# Patient Record
Sex: MALE | Race: WHITE | URBAN - METROPOLITAN AREA
[De-identification: names, ages, dates, MRNs, and addresses within clinical notes are randomized per-mention and may not be internally consistent; named-entity substitution may affect disease eponyms.]

---

## 2021-12-20 ENCOUNTER — IMMUNIZATION (OUTPATIENT)
Dept: NURSING | Facility: CLINIC | Age: 51
End: 2021-12-20
Payer: COMMERCIAL

## 2021-12-20 PROCEDURE — 91300 PR COVID VAC PFIZER DIL RECON 30 MCG/0.3 ML IM: CPT

## 2021-12-20 PROCEDURE — 0004A PR COVID VAC PFIZER DIL RECON 30 MCG/0.3 ML IM: CPT

## 2022-01-05 ENCOUNTER — LAB (OUTPATIENT)
Dept: URGENT CARE | Facility: URGENT CARE | Age: 52
End: 2022-01-05
Attending: PHYSICIAN ASSISTANT
Payer: COMMERCIAL

## 2022-01-05 ENCOUNTER — E-VISIT (OUTPATIENT)
Dept: URGENT CARE | Facility: URGENT CARE | Age: 52
End: 2022-01-05
Payer: COMMERCIAL

## 2022-01-05 DIAGNOSIS — Z20.822 CLOSE EXPOSURE TO 2019 NOVEL CORONAVIRUS: Primary | ICD-10-CM

## 2022-01-05 DIAGNOSIS — Z20.822 CLOSE EXPOSURE TO 2019 NOVEL CORONAVIRUS: ICD-10-CM

## 2022-01-05 LAB — SARS-COV-2 RNA RESP QL NAA+PROBE: NEGATIVE

## 2022-01-05 PROCEDURE — U0003 INFECTIOUS AGENT DETECTION BY NUCLEIC ACID (DNA OR RNA); SEVERE ACUTE RESPIRATORY SYNDROME CORONAVIRUS 2 (SARS-COV-2) (CORONAVIRUS DISEASE [COVID-19]), AMPLIFIED PROBE TECHNIQUE, MAKING USE OF HIGH THROUGHPUT TECHNOLOGIES AS DESCRIBED BY CMS-2020-01-R: HCPCS

## 2022-01-05 PROCEDURE — U0005 INFEC AGEN DETEC AMPLI PROBE: HCPCS

## 2022-01-05 PROCEDURE — 99421 OL DIG E/M SVC 5-10 MIN: CPT | Performed by: PHYSICIAN ASSISTANT

## 2022-01-05 NOTE — PATIENT INSTRUCTIONS
Arnel,    Based on your exposure to COVID-19 (coronavirus), we would like to test you for this virus. I have placed an order for this test. The best time for testing is 5-7 days after the exposure.    How to schedule:  For all employees or close contacts (except [place] and [employer] - see below), go to your [employer] home page and scroll down to the section that says  You have an appointment that needs to be scheduled  and click the large green button that says  Schedule Now  and follow the steps to find the next available opening.     If you are unable to complete these steps or if you cannot find any available times, please call [phone] to schedule employee testing.     [place] employees or close contacts, please call [phone].   [place] ([employer]) employees or close contacts call [phone].    Monoclonal antibody treatment after exposure:  Because you have been exposed to COVID-19, you may be able to receive a treatment with monoclonal antibodies. This treatment can lower your risk of severe illness and going to the hospital. It is given through an IV or under your skin (subcutaneous) and must be given at an infusion center.   To be eligible, you must be 12 years or older, at least 88 pounds and:    Are not fully vaccinated against COVID-19, OR    Are immunocompromised     If you would like to sign up to be considered to receive the monoclonal antibody medicine, please complete a participation form through the [facility] here:  MNRAP ([email or web address]/[email or web address]). You may also call the [facility] COVID-19 Public Hotline at 1-877.415.1967 (open Mon-Fri: 9am-7pm and Sat: 10am-6pm).     Not all people who are eligible will receive the medicine since supply is limited. You will be contacted in the next 1 to 2 business days only if you are selected. If you do not receive a call, you have not been selected to receive the medicine. If you have any questions  about this medication, please contact your primary care provider. For more information, see [email or web address]/[email or web address]/[email or web address]/[email or web address]    Return to work/school/ guidance:   Please let your workplace manager and staffing office know when your quarantine ends. We cannot give you an exact date as it depends on the information below. You can calculate this on your own or work with your manager/staffing office to calculate this.    Quarantine Guidelines:  You are considered exposed if you have been within 6 feet of an infected person(s) for 15 minutes or more over a 24-hour period. Quarantine will start after the LAST time you had contact with the infected person while they were contagious (for example, if you saw someone on Monday and Wednesday, your quarantine would start after Wednesday).     If you have NO symptoms (asymptomatic):    Stay home for 14 days (quarantine) after your LAST contact with a person who has COVID-19 (this remains the CDC recommendation for greatest protection against spread of COVID-19), OR    10-day quarantine with no test, OR    Minimum 7-day quarantine with negative RT-PCR test collected on day 5 or later    Quarantine Guideline Exceptions:    People who have been fully vaccinated do not need to quarantine unless they have symptoms. You are considered fully vaccinated 2 weeks after your 2nd dose in a 2-dose series or 2 weeks after a single-dose series. This includes vaccinated health care workers.  o Fully vaccinated people should still get tested 5-7 days after exposure, even if they don t have symptoms.   Note: If you have ongoing exposure to the COVID-positive person, this quarantine period may be more than 14 days. For example, if you continue to be exposed to your child who tested positive and cannot isolate from them, then the quarantine of 7-14 days can't start until your child is no longer contagious. This is typically 10 days from onset of the child's  symptoms. So, the total duration may be 17-24 days in this case.   Please contact your doctor if you have questions or call the [facility] Public Hotline: 1-193.406.2915 (Mon-Fri: 9am-7pm and Sat: 10am-6pm).     How to Quarantine:     Monitor your symptoms until 14 days after your last exposure. If you develop signs or symptoms, isolate and get tested (even if you have been tested already).    Stay home and away from others. Don't go to school or anywhere else. Generally, quarantine means staying home from work but there are some exceptions to this. Please contact your workplace. Cover your mouth and nose with a face covering if you must be around other people.     Wash your hands and face often. Use soap and water.    What are the symptoms of COVID-19?  The most common symptoms are cough, fever and trouble breathing. Less common symptoms include headache, body aches, fatigue (feeling very tired), chills, sore throat, stuffy or runny nose, diarrhea (loose poop), loss of taste or smell, belly pain, and nausea or vomiting (feeling sick to your stomach or throwing up).  If you develop symptoms, follow these guidelines:    If you're normally healthy: Please start another eVisit.    If you have a serious health problem (like cancer, heart failure, an organ transplant or kidney disease): Call your specialty clinic. Let them know that you might have COVID-19.    Where can I get more information?    [facility] - About COVID-19: [email or web address]     CDC - What to Do If You're Sick:      www.cdc.gov/coronavirus/2019-ncov/about/steps-when-sick.html    CDC - Ending Home Isolation:  https://www.cdc.gov/coronavirus/2019-ncov/your-health/quarantine-isolation.html    CDC - Caring for Someone:  www.cdc.gov/coronavirus/2019-ncov/if-you-are-sick/care-for-someone.html    [facility] clinical trials (COVID-19 research studies): clinicalaffairs.[facility]/n-clinical-trials    Below are the COVID-19 hotlines at  "the [place] Department of Health ([facility]). Interpreters are available.  o For health questions: Call 710-316-4370 or 1-335.554.1498 (7 a.m. to 7 p.m.)  o For questions about schools and childcare: Call 378-195-5103 or 1-511.526.2809 (7 a.m. to 7 p.m.)    January 5, 2022  RE:  Abhinav Barrett                                                                                                                   [address] [facility] [address]      To whom it may concern:    I evaluated Abhinav Barrett on January 5, 2022. Abhinav Barrett should be excused from work/school.    They should let their workplace manager and staffing office know when their quarantine ends.    We can not give an exact date as it depends on the information below. They can calculate this on their own or work with their manager/staffing office to calculate this. (For example if they were exposed on 10/04, they would have to quarantine for 14 full days. That would be through 10/18. They could return on 10/19.)    Quarantine Guidelines:    Patients (\"contacts\") who have been in close prolonged contact of an infected person(s) (within six feet for at least 15 minutes within a 24 hour period) and remain asymptomatic should enter quarantine based on the following options:      14-day quarantine period (this remains the CDC recommendation for the greatest protection against spread of COVID-19) OR    Minimum 7-day quarantine with negative RT-PCR test collected on day 5 or later OR    10-day quarantine with no test   Quarantine Guideline exceptions are as follows:    People who have been fully vaccinated do not need to quarantine if the exposure was at least 2 weeks after the last vaccination. This includes vaccinated health care workers.    Not fully vaccinated and unvaccinated Individuals who work in health care, congregate care, or congregate living should be off work for 14 days from their last date of exposure. Community activities for " this group can be resumed based on options above. Fully vaccinated individuals in this group do not need to quarantine from work after exposure.    Not fully vaccinated and unvaccinated people whose high-risk exposure was a household member should always quarantine for 14 days from their last date of exposure. Fully vaccinated people in this category do not need to quarantine.    Not fully vaccinated or unvaccinated residents of congregate care and congregate living settings should always quarantine for 14 days from their last date of exposure. Fully vaccinated residents do not need to quarantine.    Note: If there is ongoing exposure to the covid positive person, this quarantine period may be longer than 14 days. (For example, if they are continually exposed to their child, who tested positive and cannot isolate from them, then the quarantine of 7-14 days can't start until their child is no longer contagious. This is typically 10 days from onset to the child's symptoms. So the total duration may be 17-24 days in this case.)    Abhinav Barrett should continue symptom monitoring until day 14 post-exposure. If they develop signs or symptoms of COVID-19, they should isolate and get tested (even if they have been tested already).    Sincerely,  Deandre Pendleton PA-C

## 2022-06-02 ENCOUNTER — E-VISIT (OUTPATIENT)
Dept: URGENT CARE | Facility: CLINIC | Age: 52
End: 2022-06-02
Payer: COMMERCIAL

## 2022-06-02 ENCOUNTER — OFFICE VISIT (OUTPATIENT)
Dept: URGENT CARE | Facility: URGENT CARE | Age: 52
End: 2022-06-02
Payer: COMMERCIAL

## 2022-06-02 VITALS
TEMPERATURE: 97.4 F | OXYGEN SATURATION: 96 % | HEART RATE: 60 BPM | DIASTOLIC BLOOD PRESSURE: 90 MMHG | WEIGHT: 145.6 LBS | SYSTOLIC BLOOD PRESSURE: 137 MMHG | BODY MASS INDEX: 28.44 KG/M2

## 2022-06-02 DIAGNOSIS — H57.9 ITCHY EYES: Primary | ICD-10-CM

## 2022-06-02 DIAGNOSIS — H10.13 ALLERGIC CONJUNCTIVITIS OF BOTH EYES: Primary | ICD-10-CM

## 2022-06-02 PROCEDURE — 99207 PR NON-BILLABLE SERV PER CHARTING: CPT | Performed by: FAMILY MEDICINE

## 2022-06-02 PROCEDURE — 99203 OFFICE O/P NEW LOW 30 MIN: CPT | Performed by: PHYSICIAN ASSISTANT

## 2022-06-02 RX ORDER — OLOPATADINE HYDROCHLORIDE 1 MG/ML
1 SOLUTION/ DROPS OPHTHALMIC 2 TIMES DAILY
Qty: 51 ML | Refills: 0 | Status: SHIPPED | OUTPATIENT
Start: 2022-06-02 | End: 2022-06-07

## 2022-06-02 RX ORDER — CETIRIZINE HYDROCHLORIDE 10 MG/1
10 TABLET ORAL DAILY
Qty: 30 TABLET | Refills: 0 | Status: SHIPPED | OUTPATIENT
Start: 2022-06-02

## 2022-06-02 NOTE — PATIENT INSTRUCTIONS
Dear Abhinav Barrett,    We are sorry you are not feeling well. Based on the responses you provided, it is recommended that you be seen in-person in urgent care so we can better evaluate your symptoms. Please click here to find the nearest urgent care location to you.   You will not be charged for this Visit. Thank you for trusting us with your care.    Leatha Stanley MD

## 2022-06-02 NOTE — PROGRESS NOTES
Assessment & Plan     Allergic conjunctivitis of both eyes    Conjunctivitis is an irritation of the thin membrane covering the eye and the inside of the eyelid. This membrane is called the conjunctiva. The condition is often called pink eye or red eye because the eye looks pink or red. The eye may also be swollen, itchy, burning, or tearing. A watery or mucous discharge may occur. This type of conjunctivitis is not contagious.   Allergic conjunctivitis is caused by an allergen. Allergens are substances that cause the body to react with certain symptoms. Allergens that cause eye irritation include things such as house dust, smoke, or pollen in the air. This can occur seasonally, most often in the spring. Other possible allergens that may cause symptoms include cosmetics, perfumes, animal saliva or dander, chlorine in swimming pools, or contact lens.     - olopatadine (PATANOL) 0.1 % ophthalmic solution; Place 1 drop into both eyes 2 times daily for 5 days  - cetirizine (ZYRTEC) 10 MG tablet; Take 1 tablet (10 mg) by mouth daily        No follow-ups on file.    Victor Manuel Molina, [facility], PA-C  [facility] ROLANDO Seals is a 52 year old who presents for the following health issues     HPI     Abhinav Barrett, 52 year old, male presents to the urgent care today with:   Urgent Care (Itchy , crusted, watery eyes, irritation, and eye lids hurt which started five days ago.)      Review of Systems   Constitutional, HEENT, cardiovascular, pulmonary, gi and gu systems are negative, except as otherwise noted.      Objective    BP (!) 137/90 (BP Location: Left arm, Patient Position: Sitting, Cuff Size: Adult Regular)   Pulse 60   Temp 97.4  F (36.3  C) (Tympanic)   Wt 66 kg (145 lb 9.6 oz)   SpO2 96%   BMI 28.44 kg/m    Body mass index is 28.44 kg/m .  Physical Exam   GENERAL: healthy, alert and no distress  EYES: Eyes grossly normal to inspection, PERRL and conjunctivae and sclerae  normal  HENT: ear canals and TM's normal, nose and mouth without ulcers or lesions  NECK: no adenopathy, no asymmetry, masses, or scars and thyroid normal to palpation  MS: no gross musculoskeletal defects noted, no edema  SKIN: Positive for itching along edges of eyelids  NEURO: Normal strength and tone, mentation intact and speech normal  PSYCH: mentation appears normal, affect normal/bright

## 2022-07-22 ENCOUNTER — OFFICE VISIT (OUTPATIENT)
Dept: URGENT CARE | Facility: URGENT CARE | Age: 52
End: 2022-07-22
Payer: COMMERCIAL

## 2022-07-22 VITALS
SYSTOLIC BLOOD PRESSURE: 127 MMHG | HEART RATE: 80 BPM | DIASTOLIC BLOOD PRESSURE: 88 MMHG | OXYGEN SATURATION: 99 % | TEMPERATURE: 98.7 F

## 2022-07-22 DIAGNOSIS — J30.89 SEASONAL ALLERGIC RHINITIS DUE TO OTHER ALLERGIC TRIGGER: ICD-10-CM

## 2022-07-22 DIAGNOSIS — J06.9 UPPER RESPIRATORY TRACT INFECTION, UNSPECIFIED TYPE: Primary | ICD-10-CM

## 2022-07-22 LAB — SARS-COV-2 RNA RESP QL NAA+PROBE: NEGATIVE

## 2022-07-22 PROCEDURE — 99214 OFFICE O/P EST MOD 30 MIN: CPT | Performed by: PHYSICIAN ASSISTANT

## 2022-07-22 PROCEDURE — U0005 INFEC AGEN DETEC AMPLI PROBE: HCPCS | Performed by: PHYSICIAN ASSISTANT

## 2022-07-22 PROCEDURE — U0003 INFECTIOUS AGENT DETECTION BY NUCLEIC ACID (DNA OR RNA); SEVERE ACUTE RESPIRATORY SYNDROME CORONAVIRUS 2 (SARS-COV-2) (CORONAVIRUS DISEASE [COVID-19]), AMPLIFIED PROBE TECHNIQUE, MAKING USE OF HIGH THROUGHPUT TECHNOLOGIES AS DESCRIBED BY CMS-2020-01-R: HCPCS | Performed by: PHYSICIAN ASSISTANT

## 2022-07-22 RX ORDER — OLOPATADINE HYDROCHLORIDE 1 MG/ML
1 SOLUTION/ DROPS OPHTHALMIC 2 TIMES DAILY
Qty: 5 ML | Refills: 0 | Status: SHIPPED | OUTPATIENT
Start: 2022-07-22

## 2022-07-22 RX ORDER — CETIRIZINE HYDROCHLORIDE 10 MG/1
10 TABLET ORAL DAILY
Qty: 30 TABLET | Refills: 0 | Status: SHIPPED | OUTPATIENT
Start: 2022-07-22

## 2022-07-22 RX ORDER — FLUTICASONE PROPIONATE 50 MCG
1 SPRAY, SUSPENSION (ML) NASAL DAILY
Qty: 16 G | Refills: 0 | Status: SHIPPED | OUTPATIENT
Start: 2022-07-22

## 2022-07-22 NOTE — PROGRESS NOTES
Assessment & Plan     Upper respiratory tract infection, unspecified type    Patient was educated on the natural course of viral illness which typically lasts up to 7-10 days.  Conservative measures discussed including rest, increased fluids, nasal saline irrigation (neti pot), warm steamy shower, salt water gargles, cough suppressants, expectorants (Mucinex), afrin nasal spray for up to 3 days as needed for congestion, and analgesics (Tylenol and/or Ibuprofen). If symptoms persist or worsen then follow up with primary care provider or return to the urgent care.  Seek emergency care if you develop fever over 104 shortness of breath, neck stiffness with headache or severe symptoms that require immediate attention.  Patient understands and agrees with plan.     - Symptomatic; Unknown COVID-19 Virus (Coronavirus) by PCR Nose    Seasonal allergic rhinitis due to other allergic trigger    - fluticasone (FLONASE) 50 MCG/ACT nasal spray; Spray 1 spray into both nostrils daily  - cetirizine (ZYRTEC) 10 MG tablet; Take 1 tablet (10 mg) by mouth daily  - olopatadine (PATANOL) 0.1 % ophthalmic solution; Place 1 drop into both eyes 2 times daily    Review of external notes as documented elsewhere in note    At today's visit with Abhinav Barrett , we discussed results, diagnosis, medications and formulated a plan.  We also discussed red flags for immediate return to clinic/ER, as well as indications for follow up with PCP if not improved in 3 days. Patient understood and agreed to plan. Abhinav Barrett was discharged with stable vitals and has no further questions.       No follow-ups on file.    Victor Manuel Molina, [facility], PA-OSMAR  [facility] ROLANDO Seals is a 52 year old, presenting for the following health issues:  Nasal Congestion (RUNNY NOSE DRAINING DOWN THROAT AND NOSE, WATERY CRUSTY EYES, SNEEZING, FOR THE PAST 3 DAYS, ITCHY MOUTH) and Headache (FOR THE PAST 3 DAYS)      HPI     Abhinav MOORE  Arnold, 52 year old, male presents to the urgent care today with:   Nasal Congestion (RUNNY NOSE DRAINING DOWN THROAT AND NOSE, WATERY CRUSTY EYES, SNEEZING, FOR THE PAST 3 DAYS, ITCHY MOUTH) and Headache (FOR THE PAST 3 DAYS)      Review of Systems   Constitutional, HEENT, cardiovascular, pulmonary, gi and gu systems are negative, except as otherwise noted.      Objective    /88 (BP Location: Left arm, Patient Position: Sitting, Cuff Size: Adult Regular)   Pulse 80   Temp 98.7  F (37.1  C)   SpO2 99%   There is no height or weight on file to calculate BMI.  Physical Exam   GENERAL: healthy, alert and no distress  EYES: Eyes grossly normal to inspection, PERRL and conjunctivae and sclerae normal  HENT: Positive for nasal congestion, runny nose  NECK: no adenopathy, no asymmetry, masses, or scars and thyroid normal to palpation  RESP: lungs clear to auscultation - no rales, rhonchi or wheezes  CV: regular rate and rhythm, normal S1 S2, no S3 or S4, no murmur, click or rub, no peripheral edema and peripheral pulses strong  MS: no gross musculoskeletal defects noted, no edema  SKIN: no suspicious lesions or rashes  NEURO: Normal strength and tone, mentation intact and speech normal  PSYCH: mentation appears normal, affect normal/bright                    .  ..

## 2022-09-12 ENCOUNTER — OFFICE VISIT (OUTPATIENT)
Dept: URGENT CARE | Facility: URGENT CARE | Age: 52
End: 2022-09-12
Payer: COMMERCIAL

## 2022-09-12 VITALS
OXYGEN SATURATION: 98 % | SYSTOLIC BLOOD PRESSURE: 128 MMHG | RESPIRATION RATE: 18 BRPM | DIASTOLIC BLOOD PRESSURE: 84 MMHG | WEIGHT: 145 LBS | HEART RATE: 75 BPM | TEMPERATURE: 96.9 F | BODY MASS INDEX: 28.32 KG/M2

## 2022-09-12 DIAGNOSIS — R07.0 THROAT PAIN: Primary | ICD-10-CM

## 2022-09-12 DIAGNOSIS — H60.393 INFECTIVE OTITIS EXTERNA, BILATERAL: ICD-10-CM

## 2022-09-12 LAB
DEPRECATED S PYO AG THROAT QL EIA: NEGATIVE
GROUP A STREP BY PCR: NOT DETECTED
SARS-COV-2 RNA RESP QL NAA+PROBE: NEGATIVE

## 2022-09-12 PROCEDURE — U0005 INFEC AGEN DETEC AMPLI PROBE: HCPCS | Performed by: FAMILY MEDICINE

## 2022-09-12 PROCEDURE — 87651 STREP A DNA AMP PROBE: CPT | Performed by: FAMILY MEDICINE

## 2022-09-12 PROCEDURE — U0003 INFECTIOUS AGENT DETECTION BY NUCLEIC ACID (DNA OR RNA); SEVERE ACUTE RESPIRATORY SYNDROME CORONAVIRUS 2 (SARS-COV-2) (CORONAVIRUS DISEASE [COVID-19]), AMPLIFIED PROBE TECHNIQUE, MAKING USE OF HIGH THROUGHPUT TECHNOLOGIES AS DESCRIBED BY CMS-2020-01-R: HCPCS | Performed by: FAMILY MEDICINE

## 2022-09-12 PROCEDURE — 99213 OFFICE O/P EST LOW 20 MIN: CPT | Mod: CS | Performed by: FAMILY MEDICINE

## 2022-09-12 RX ORDER — NEOMYCIN SULFATE, POLYMYXIN B SULFATE AND HYDROCORTISONE 10; 3.5; 1 MG/ML; MG/ML; [USP'U]/ML
3 SUSPENSION/ DROPS AURICULAR (OTIC) 4 TIMES DAILY
Qty: 6 ML | Refills: 0 | Status: SHIPPED | OUTPATIENT
Start: 2022-09-12 | End: 2022-09-22

## 2022-09-12 NOTE — PROGRESS NOTES
SUBJECTIVE:Abhinav Barrett is a 52 year old male with a chief complaint of sore throat.    Onset of symptoms was 4 day(s) ago.    Course of illness: still present.    Severity moderate  Predisposing factors include ill contact: Family member .    Past Medical History:   Diagnosis Date     NO ACTIVE PROBLEMS      Allergies   Allergen Reactions     No Known Drug Allergies      Social History     Tobacco Use     Smoking status: Never Smoker     Smokeless tobacco: Not on file   Substance Use Topics     Alcohol use: Yes       ROS:  SKIN: no rash  GI: no vomiting    OBJECTIVE:   /84   Pulse 75   Temp 96.9  F (36.1  C) (Tympanic)   Resp 18   Wt 65.8 kg (145 lb)   SpO2 98%   BMI 28.32 kg/m  GENERAL APPEARANCE: healthy, alert and no distress  EYES: EOMI,  PERRL, conjunctiva clear  HENT: ear canals and TM's normal.  Nose normal.  Pharynx erythematous with some exudate noted.  RESP: lungs clear to auscultation - no rales, rhonchi or wheezes  SKIN: no suspicious lesions or rashes    Rapid Strep test is negative; await throat culture results.      ICD-10-CM    1. Throat pain  R07.0 Streptococcus A Rapid Screen w/Reflex to PCR     Symptomatic; Yes; 9/8/2022 COVID-19 Virus (Coronavirus) by PCR Nose     Group A Streptococcus PCR Throat Swab   2. Infective otitis externa, bilateral  H60.393 neomycin-polymyxin-hydrocortisone (CORTISPORIN) [id number] otic suspension     Symptomatic treat with gargles, lozenges, and OTC analgesic as needed.  Follow-up with primary clinic if not improving.

## 2023-09-25 ENCOUNTER — OFFICE VISIT (OUTPATIENT)
Dept: URGENT CARE | Facility: URGENT CARE | Age: 53
End: 2023-09-25
Payer: COMMERCIAL

## 2023-09-25 VITALS
TEMPERATURE: 98.9 F | OXYGEN SATURATION: 97 % | DIASTOLIC BLOOD PRESSURE: 70 MMHG | HEART RATE: 134 BPM | RESPIRATION RATE: 18 BRPM | SYSTOLIC BLOOD PRESSURE: 122 MMHG

## 2023-09-25 DIAGNOSIS — J06.9 UPPER RESPIRATORY TRACT INFECTION, UNSPECIFIED TYPE: Primary | ICD-10-CM

## 2023-09-25 PROCEDURE — 87635 SARS-COV-2 COVID-19 AMP PRB: CPT | Performed by: FAMILY MEDICINE

## 2023-09-25 PROCEDURE — 99214 OFFICE O/P EST MOD 30 MIN: CPT | Performed by: FAMILY MEDICINE

## 2023-09-25 RX ORDER — BENZONATATE 200 MG/1
200 CAPSULE ORAL 3 TIMES DAILY PRN
Qty: 21 CAPSULE | Refills: 0 | Status: SHIPPED | OUTPATIENT
Start: 2023-09-25 | End: 2023-10-02

## 2023-09-25 RX ORDER — CETIRIZINE HYDROCHLORIDE 10 MG/1
10 TABLET ORAL DAILY
Qty: 30 TABLET | Refills: 0 | Status: SHIPPED | OUTPATIENT
Start: 2023-09-25 | End: 2023-10-25

## 2023-09-25 RX ORDER — FLUTICASONE PROPIONATE 50 MCG
2 SPRAY, SUSPENSION (ML) NASAL DAILY
Qty: 15.8 ML | Refills: 0 | Status: SHIPPED | OUTPATIENT
Start: 2023-09-25 | End: 2023-10-23

## 2023-09-25 NOTE — PROGRESS NOTES
"SUBJECTIVE: Abhinav Barrett is a 53 year old male presenting with a chief complaint of \"cold symptoms\".  Onset of symptoms was 4 day(s) ago.  Predisposing factors include None.    Past Medical History:   Diagnosis Date    NO ACTIVE PROBLEMS      Allergies   Allergen Reactions    No Known Drug Allergy      Social History     Tobacco Use    Smoking status: Never    Smokeless tobacco: Never   Substance Use Topics    Alcohol use: Yes       ROS:  SKIN: no rash  GI: no vomiting    OBJECTIVE:  /70   Pulse (!) 134   Temp 98.9  F (37.2  C) (Tympanic)   Resp 18   SpO2 97% GENERAL APPEARANCE: healthy, alert and no distress  EYES: EOMI,  PERRL, conjunctiva clear  HENT: ear canals and TM's normal.  Nose and mouth without ulcers, erythema or lesions  RESP: lungs clear to auscultation - no rales, rhonchi or wheezes  SKIN: no suspicious lesions or rashes      ICD-10-CM    1. Upper respiratory tract infection, unspecified type  J06.9 benzonatate (TESSALON) 200 MG capsule     cetirizine (ZYRTEC) 10 MG tablet     fluticasone (FLONASE) 50 MCG/ACT nasal spray     Symptomatic COVID-19 Virus (Coronavirus) by PCR          Fluids/Rest, f/u if worse/not any better    "

## 2023-09-26 LAB — SARS-COV-2 RNA RESP QL NAA+PROBE: NEGATIVE

## 2023-10-23 DIAGNOSIS — J06.9 UPPER RESPIRATORY TRACT INFECTION, UNSPECIFIED TYPE: ICD-10-CM

## 2023-10-23 RX ORDER — FLUTICASONE PROPIONATE 50 MCG
2 SPRAY, SUSPENSION (ML) NASAL DAILY
Qty: 16 G | Refills: 0 | Status: SHIPPED | OUTPATIENT
Start: 2023-10-23

## 2023-11-25 ENCOUNTER — HEALTH MAINTENANCE LETTER (OUTPATIENT)
Age: 53
End: 2023-11-25

## 2024-05-03 ENCOUNTER — OFFICE VISIT (OUTPATIENT)
Dept: URGENT CARE | Facility: URGENT CARE | Age: 54
End: 2024-05-03
Payer: COMMERCIAL

## 2024-05-03 VITALS
WEIGHT: 145 LBS | DIASTOLIC BLOOD PRESSURE: 100 MMHG | SYSTOLIC BLOOD PRESSURE: 152 MMHG | OXYGEN SATURATION: 96 % | TEMPERATURE: 99.3 F | BODY MASS INDEX: 28.32 KG/M2 | HEART RATE: 90 BPM

## 2024-05-03 DIAGNOSIS — R50.9 FEVER AND CHILLS: ICD-10-CM

## 2024-05-03 DIAGNOSIS — J02.0 STREP THROAT: ICD-10-CM

## 2024-05-03 DIAGNOSIS — R07.0 THROAT PAIN: Primary | ICD-10-CM

## 2024-05-03 LAB
DEPRECATED S PYO AG THROAT QL EIA: POSITIVE
FLUAV AG SPEC QL IA: NEGATIVE
FLUBV AG SPEC QL IA: NEGATIVE

## 2024-05-03 PROCEDURE — 99213 OFFICE O/P EST LOW 20 MIN: CPT | Performed by: FAMILY MEDICINE

## 2024-05-03 PROCEDURE — 87804 INFLUENZA ASSAY W/OPTIC: CPT | Performed by: FAMILY MEDICINE

## 2024-05-03 PROCEDURE — 87880 STREP A ASSAY W/OPTIC: CPT | Performed by: FAMILY MEDICINE

## 2024-05-03 RX ORDER — AMOXICILLIN 500 MG/1
500 CAPSULE ORAL 2 TIMES DAILY
Qty: 20 CAPSULE | Refills: 0 | Status: SHIPPED | OUTPATIENT
Start: 2024-05-03 | End: 2024-05-13

## 2024-05-04 NOTE — PROGRESS NOTES
SUBJECTIVE:Abhinav Barrett is a 53 year old male with a chief complaint of sore throat.    Onset of symptoms was 2 day(s) ago.    Course of illness: still present.      Past Medical History:   Diagnosis Date    NO ACTIVE PROBLEMS      Allergies   Allergen Reactions    No Known Drug Allergy      Social History     Tobacco Use    Smoking status: Never    Smokeless tobacco: Never   Substance Use Topics    Alcohol use: Yes       ROS:  SKIN: no rash  GI: no vomiting    OBJECTIVE:   BP (!) 152/100   Pulse 90   Temp 99.3  F (37.4  C) (Tympanic)   Wt 65.8 kg (145 lb)   SpO2 96%   BMI 28.32 kg/m  GENERAL APPEARANCE: healthy, alert and no distress  EYES: EOMI,  PERRL, conjunctiva clear  HENT: ear canals and TM's normal.  Nose normal.  Pharynx erythematous with some exudate noted.  SKIN: no suspicious lesions or rashes    Rapid Strep test is positive      ICD-10-CM    1. Throat pain  R07.0 Streptococcus A Rapid Screen w/Reflex to PCR - Clinic Collect     Influenza A & B Antigen - Clinic Collect      2. Fever and chills  R50.9 Influenza A & B Antigen - Clinic Collect      3. Strep throat  J02.0 amoxicillin (AMOXIL) 500 MG capsule          Symptomatic treat with gargles, lozenges, and OTC analgesic as needed.  Follow-up with primary clinic if not improving.

## 2024-07-17 ENCOUNTER — OFFICE VISIT (OUTPATIENT)
Dept: URGENT CARE | Facility: URGENT CARE | Age: 54
End: 2024-07-17
Payer: COMMERCIAL

## 2024-07-17 VITALS
DIASTOLIC BLOOD PRESSURE: 84 MMHG | HEART RATE: 114 BPM | OXYGEN SATURATION: 97 % | BODY MASS INDEX: 27.15 KG/M2 | TEMPERATURE: 100.2 F | RESPIRATION RATE: 20 BRPM | WEIGHT: 139 LBS | SYSTOLIC BLOOD PRESSURE: 134 MMHG

## 2024-07-17 DIAGNOSIS — R09.81 STUFFY NOSE: ICD-10-CM

## 2024-07-17 DIAGNOSIS — M79.10 MYALGIA: ICD-10-CM

## 2024-07-17 DIAGNOSIS — R50.9 FEVER AND CHILLS: Primary | ICD-10-CM

## 2024-07-17 LAB
FLUAV AG SPEC QL IA: NEGATIVE
FLUBV AG SPEC QL IA: NEGATIVE

## 2024-07-17 PROCEDURE — 99213 OFFICE O/P EST LOW 20 MIN: CPT | Performed by: STUDENT IN AN ORGANIZED HEALTH CARE EDUCATION/TRAINING PROGRAM

## 2024-07-17 PROCEDURE — 87804 INFLUENZA ASSAY W/OPTIC: CPT | Performed by: STUDENT IN AN ORGANIZED HEALTH CARE EDUCATION/TRAINING PROGRAM

## 2024-07-17 PROCEDURE — 87635 SARS-COV-2 COVID-19 AMP PRB: CPT | Performed by: STUDENT IN AN ORGANIZED HEALTH CARE EDUCATION/TRAINING PROGRAM

## 2024-07-17 NOTE — LETTER
July 17, 2024      Abhinav Barrett  [address]  [facility] [address]        To Whom It May Concern:    Abhinav Barrett  was seen on 7/17/20247.  Please excuse him  until 7/20/2024 due to illness. Please excuse for the days missed due to illnesss.         Sincerely,        Ruperto Hensley MD

## 2024-07-18 ENCOUNTER — TELEPHONE (OUTPATIENT)
Dept: NURSING | Facility: CLINIC | Age: 54
End: 2024-07-18
Payer: COMMERCIAL

## 2024-07-18 LAB — SARS-COV-2 RNA RESP QL NAA+PROBE: POSITIVE

## 2024-07-18 NOTE — PATIENT INSTRUCTIONS
-Conservative measures such as warm water or tea with honey  -Using a steamer, okay to use vicks menthol in the steamer  -As needed ibuprofen or Tylenol for fever  -Push fluids and rest  -Patient aware that her results will be delivered through MyChart  -Seek urgent medical care if symptoms worsen, you notice increased fever despite taking meds, van not eat or you feel very weak

## 2024-07-18 NOTE — TELEPHONE ENCOUNTER
Patient classified as COVID treatment eligible by Epic high risk algorithm:  No    Coronavirus (COVID-19) Notification    Reason for call  Notify of POSITIVE COVID-19 lab result, assess symptoms,  review [facility] recommendations    Lab Result   Lab test for 2019-nCoV rRt-PCR or SARS-COV-2 PCR  Oropharyngeal AND/OR nasopharyngeal swabs were POSITIVE for 2019-nCoV RNA [OR] SARS-COV-2 RNA (COVID-19) RNA     We have been unable to reach patient by phone at this time to notify of their Positive COVID-19 result.    Left voicemail message requesting a call back to [phone] [facility] for results.        A Positive COVID-19 letter will be sent via [employer] or the mail.    Leatha Last

## 2024-07-18 NOTE — PROGRESS NOTES
chief complaint: URI symptoms    HPI:  Abhinav Barrett is a 54 year old male who presents today complaining of generalized body aches, fever, chills, stuffy nose, watery stool and weakness.  Patient symptoms started mainly about 3 days ago at night.  Also has some headache still.  Tried as needed acetaminophen.  Not feeling great at all.  Has not noticed any shortness of breath, chest pain, dysuria flank pain, stomachache or any additional symptoms    History obtained from the patient.    Problem List:  2010-10: CARDIOVASCULAR SCREENING; LDL GOAL LESS THAN 160      Past Medical History:   Diagnosis Date    NO ACTIVE PROBLEMS        Social History     Tobacco Use    Smoking status: Never    Smokeless tobacco: Never   Substance Use Topics    Alcohol use: Yes       Review of systems  Constitutional: healthy, alert, and no distress  Head: Normocephalic.   Neck: Neck supple. No adenopathy. T  ENT: ENT exam normal, no neck nodes or sinus tenderness  Cardiovascular:  RRR. No murmurs, clicks gallops or rub  Respiratory:unlabored respiratory effort  Gastrointestinal: Abdomen soft, non-tender. BS normal. No masses, organomegaly  Musculoskeletal: extremities normal- no gross deformities noted, gait normal  Psychiatric: mentation appears normal and affect normal/bright        Vitals:    07/17/24 1918   BP: 134/84   BP Location: Left arm   Patient Position: Sitting   Cuff Size: Adult Regular   Pulse: 114   Resp: 20   Temp: 100.2  F (37.9  C)   TempSrc: Oral   SpO2: 97%   Weight: 63 kg (139 lb)       Physical Exam  Constitutional: healthy, alert, and no distress  Head: Normocephalic.   Neck: Neck supple. No adenopathy. T  ENT: Redness around the bilateral tonsils but no swelling or exudates.  Uvula is midline.  Bilateral ear exam normal.  No neck nodes or sinus tenderness  Cardiovascular:  RRR. No murmurs, clicks gallops or rub  Respiratory:unlabored respiratory effort  Musculoskeletal: extremities normal- no gross deformities  noted, gait normal  Psychiatric: mentation appears normal and affect normal/bright    Assessment & Plan     Arnel was seen today for urgent care and sinus problem.    Diagnoses and all orders for this visit:    Fever and chills  Myalgia  Nasal stuffiness  Differential diagnosis of patients symptoms includes but not limited to postnasal drip, viral respiratory infection, streptococcal pharyngitis/tonsillitis, bronchiolitis, asthma exacerbation, e.t.c  Of note most likely cause is viral respiratory of infection.  Discussed safety precautions return to the ER.  -Conservative measures such as warm water or tea with honey  -Using a steamer, okay to use vicks menthol in the steamer  -As needed ibuprofen or Tylenol for fever  -Push fluids and rest  -Patient aware that her results will be delivered through MyChart, discussed isolation precautions if positive.  -     Symptomatic COVID-19 Virus (Coronavirus) by PCR Nose  -     Influenza A & B Antigen - Clinic Collect    At the end of the encounter, I discussed results, diagnosis, medications. Discussed red flags for immediate return to clinic/ER, as well as indications for follow up if no improvement. Patient understood and agreed to plan. Patient was stable for discharge.

## 2025-01-04 ENCOUNTER — HEALTH MAINTENANCE LETTER (OUTPATIENT)
Age: 55
End: 2025-01-04

## 2025-02-13 ENCOUNTER — OFFICE VISIT (OUTPATIENT)
Dept: URGENT CARE | Facility: URGENT CARE | Age: 55
End: 2025-02-13
Payer: COMMERCIAL

## 2025-02-13 VITALS
TEMPERATURE: 98.6 F | DIASTOLIC BLOOD PRESSURE: 97 MMHG | WEIGHT: 150 LBS | SYSTOLIC BLOOD PRESSURE: 143 MMHG | OXYGEN SATURATION: 98 % | BODY MASS INDEX: 29.29 KG/M2 | HEART RATE: 87 BPM | RESPIRATION RATE: 18 BRPM

## 2025-02-13 DIAGNOSIS — M54.2 NECK PAIN: Primary | ICD-10-CM

## 2025-02-13 DIAGNOSIS — S16.1XXA ACUTE CERVICAL MYOFASCIAL STRAIN, INITIAL ENCOUNTER: ICD-10-CM

## 2025-02-13 RX ORDER — LIDOCAINE 50 MG/G
1 PATCH TOPICAL EVERY 24 HOURS
Qty: 10 PATCH | Refills: 0 | Status: SHIPPED | OUTPATIENT
Start: 2025-02-13

## 2025-02-13 RX ORDER — METHYLPREDNISOLONE 4 MG/1
TABLET ORAL
Qty: 21 TABLET | Refills: 0 | Status: SHIPPED | OUTPATIENT
Start: 2025-02-13

## 2025-02-13 RX ORDER — METHOCARBAMOL 750 MG/1
750 TABLET, FILM COATED ORAL 4 TIMES DAILY PRN
Qty: 24 TABLET | Refills: 0 | Status: SHIPPED | OUTPATIENT
Start: 2025-02-13

## 2025-02-13 NOTE — PROGRESS NOTES
Assessment & Plan     Diagnosis:    ICD-10-CM    1. Neck pain  M54.2 lidocaine (LIDODERM) 5 % patch     methocarbamol (ROBAXIN) 750 MG tablet     methylPREDNISolone (MEDROL DOSEPAK) 4 MG tablet therapy pack      2. Acute cervical myofascial strain, initial encounter  S16.1XXA lidocaine (LIDODERM) 5 % patch     methocarbamol (ROBAXIN) 750 MG tablet     methylPREDNISolone (MEDROL DOSEPAK) 4 MG tablet therapy pack          Medical Decision Making:  Abhinav Barrett is a 54 year old male who presents for evaluation of right sided neck pain and stiffness. There is no radiculopathy. Clinical examination is consistent with muscle spasm and myofascial strain -- patient has been having to lift his daughter to get her to go to school over the past two weeks which likely is causing some of his strain.     I doubt fracture, ligamentous instability, myelopathy, dissection, spinal tumor or abscess at this time. I do not feel there are other worrisome etiologies at this time to prompt CT/MRI of neck/spine. I discussed worrisome symptoms/signs, if they were to evolve, that should prompt the patient to follow up more quickly or go to the ED.  There are no red flag symptoms to suggest we need further workup or advanced imaging at this point.   Supportive outpatient management is indicated.     Decided on pain control and muscle spasm control.  Try acupuncture and massage as well. Patient verbalizes understanding and agreement with the plan including reasons to go to the ER. All questions answered.       ABDIAZIZ [facility]    Subjective     Abhinav Barrett is a 54 year old male who presents to clinic today for the following health issues:  Chief Complaint   Patient presents with    Neck Pain     Neck pain for the last week. Started all of a sudden        HPI  Patient states over the last week he has had neck pain, seems like is mainly on the right side but slightly on the left side as well.  Feels  like it is in the muscles, has more pain when he turns his head to the right.  He has had some issues sleeping because of his wife's snoring, tossing and turning from side-to-side.  He is also had a physically  his young daughter to get her into the car to get her to go to school this has been causing some pain, I did not really notice it with the neck.  The pain seemed like it started when he just woke up 1 morning about a week ago.  Denies any numbness or weakness in his arms, headache, vision changes, speech changes or other concerns.    Review of Systems    See HPI    Objective      Vitals: BP (!) 143/97 (BP Location: Right arm, Patient Position: Sitting, Cuff Size: Adult Regular)   Pulse 87   Temp 98.6  F (37  C) (Oral)   Resp 18   Wt 68 kg (150 lb)   SpO2 98%   BMI 29.29 kg/m        Patient Vitals for the past 24 hrs:   BP Temp Temp src Pulse Resp SpO2 Weight   02/13/25 1621 (!) 143/97 98.6  F (37  C) Oral 87 18 98 % 68 kg (150 lb)       Vital signs reviewed by: Nahun Mendiola PA-C    Physical Exam   Constitutional: Patient is alert and cooperative. No acute distress.  Eyes: Conjunctivae, EOMI and lids are normal. PERRL.  Neck: Tenderness and slight muscle spasm of the right paraspinal and upper trapezius muscle.  No midline C-spine tenderness palpation.  No meningismus.  Neurological: Alert and oriented x3. CN 3-7 and 9-12 intact. Strength and sensation are intact and symmetric in the bilateral upper and lower extremities.   Skin: No rash noted on visualized skin.  Psychiatric:The patient has a normal mood and affect.     Nahun Mendiola PA-C, February 13, 2025